# Patient Record
Sex: FEMALE | Race: WHITE
[De-identification: names, ages, dates, MRNs, and addresses within clinical notes are randomized per-mention and may not be internally consistent; named-entity substitution may affect disease eponyms.]

---

## 2017-12-06 ENCOUNTER — HOSPITAL ENCOUNTER (OUTPATIENT)
Dept: HOSPITAL 75 - RAD | Age: 52
End: 2017-12-06
Attending: FAMILY MEDICINE
Payer: MEDICARE

## 2017-12-06 DIAGNOSIS — Z12.31: Primary | ICD-10-CM

## 2017-12-07 NOTE — DIAGNOSTIC IMAGING REPORT
Bilateral screening mammogram 2D views with tomosynthesis.



The current study was also evaluated with a Computer Aided

Detection (CAD) system.



INDICATION: Screening. No current complaints stated on the

questionnaire.



COMPARISON: 11/10/16.



FINDINGS:



Please note that only CC projections were obtained of the breasts

due to difficulty related to patient mental status. Scattered

fibroglandular densities are seen and scattered benign-appearing

calcifications are noted. Allowing for technique and positional

differences, no suspicious change is seen.



IMPRESSION: No significant change.



ACR BI-RADS Category 2: Benign findings.

Result letter will be mailed to the patient.

Note: At least 10% of breast cancer is not imaged by mammography.



Dictated by: 



  Dictated on workstation # [id number]

## 2018-02-07 ENCOUNTER — HOSPITAL ENCOUNTER (OUTPATIENT)
Dept: HOSPITAL 75 - PREOP | Age: 53
End: 2018-02-07
Attending: PODIATRIST
Payer: MEDICARE

## 2018-02-07 VITALS — WEIGHT: 145 LBS | HEIGHT: 69 IN | BODY MASS INDEX: 21.48 KG/M2

## 2018-02-07 DIAGNOSIS — Z01.818: Primary | ICD-10-CM

## 2018-02-07 DIAGNOSIS — M20.11: ICD-10-CM

## 2018-02-07 NOTE — HISTORY AND PHYSICAL
DATE OF SERVICE:  



DATE OF ADMISSION:  02/13/2018.



To have outpatient surgery by Dr. Mccurdy.



CHIEF COMPLAINT:

To have foot surgery.



ALLERGIES:

XANAX, NEOSPORIN, LITHIUM, MOBAN, RITALIN, BACTRIM, TEGRETOL, KLONOPIN, _____

AND ZOLOFT.



The patient has a diagnosis of bipolar with manic depressive and mentally

retarded.



MEDICATIONS:

Tylenol, Benadryl, hydrocortisone cream, ibuprofen p.r.n., milk of magnesia.



REVIEW OF SYSTEMS:

HEAD:  Denies headaches, dizziness, fainting.  The patient brought in by work.

EYES, EARS, NOSE AND THROAT:  No problems with the eyes, ears, nose and throat.

HEART:  No heart problems.

LUNGS:  Denies coughing, congestion and wheezing.

GASTROINTESTINAL:  Appetite okay.  Denies blood in the stool or diarrhea.  _____

no blood, pain or frequency.



PHYSICAL EXAMINATION:

GENERAL:  The patient is a white female, well-nourished, well-developed in no

acute respiratory distress at rest.

EARS:  Noninflamed.

EYES:  No conjunctivitis.

THROAT:  Noninflamed.

NECK:  Thyroid not enlarged.  No abnormal cervical lymphadenopathy noted.

HEART:  Regular rate and rhythm.

LUNGS:  Clear to auscultation.

ABDOMEN:  Soft.

VITAL SIGNS:  Pulse 76, blood pressure 130/70.



PLAN:

The patient is okay for surgery, will be on standby if she has any problems.





Job ID: [id number]

DocumentID: [id number]

Dictated Date:  02/07/2018 10:11:15

Transcription Date: 02/07/2018 11:16:47

Dictated By: FOZIA SALAS DO

## 2018-02-13 ENCOUNTER — HOSPITAL ENCOUNTER (OUTPATIENT)
Dept: HOSPITAL 75 - SDC | Age: 53
Discharge: HOME | End: 2018-02-13
Attending: PODIATRIST
Payer: MEDICARE

## 2018-02-13 VITALS — SYSTOLIC BLOOD PRESSURE: 121 MMHG | DIASTOLIC BLOOD PRESSURE: 87 MMHG

## 2018-02-13 VITALS — DIASTOLIC BLOOD PRESSURE: 80 MMHG | SYSTOLIC BLOOD PRESSURE: 119 MMHG

## 2018-02-13 VITALS — DIASTOLIC BLOOD PRESSURE: 82 MMHG | SYSTOLIC BLOOD PRESSURE: 129 MMHG

## 2018-02-13 VITALS — SYSTOLIC BLOOD PRESSURE: 132 MMHG | DIASTOLIC BLOOD PRESSURE: 89 MMHG

## 2018-02-13 VITALS — BODY MASS INDEX: 21.48 KG/M2 | WEIGHT: 145 LBS | HEIGHT: 69 IN

## 2018-02-13 DIAGNOSIS — F79: ICD-10-CM

## 2018-02-13 DIAGNOSIS — M20.11: Primary | ICD-10-CM

## 2018-02-13 DIAGNOSIS — Z79.899: ICD-10-CM

## 2018-02-13 DIAGNOSIS — F31.9: ICD-10-CM

## 2018-02-13 PROCEDURE — 73620 X-RAY EXAM OF FOOT: CPT

## 2018-02-13 PROCEDURE — 87081 CULTURE SCREEN ONLY: CPT

## 2018-02-13 NOTE — DIAGNOSTIC IMAGING REPORT
INDICATION: 

Status post hallux valgus deformity repair.



COMPARISON: 

None.



FINDINGS: 

Two radiographic views of the right foot were obtained. There is

well-defined absence of the medial cortex of the distal first

metatarsal. No unexpected radiopaque foreign bodies are seen. The

osseous structures otherwise appear to be intact. The joint

spaces are maintained. There appears to be mild soft tissue

swelling.



IMPRESSION:

1. No evidence of acute fracture or dislocation of the right

foot.



2. Focal cortical defect of the distal margins of the first

metatarsal. Correlation with surgical history is recommended. No

unexpected radiopaque foreign bodies.



Dictated by: 



  Dictated on workstation # [id number]

## 2018-02-13 NOTE — DISCHARGE INSTRUCTIONS
Discharge Instructions


Discharge Medications


New, Converted or Re-Newed RX:  RX Given to Pt/Family





Patient Instructions


Patient Instructions


1. Follow up in office in 2 weeks.





2. Diet as tolerated.





3. Activity as tolerated.





Activity & Diet


Activity as Tolerated:  Yes











FOZIA PRESLEY DPM Feb 13, 2018 9:14 am

## 2018-02-13 NOTE — PROGRESS NOTE-PRE OPERATIVE
Pre-Operative Progress Note


H&P Reviewed


The H&P was reviewed, patient examined and no changes noted.


Date Seen by Provider:  Feb 13, 2018


Time Seen by Provider:  07:30


Date H&P Reviewed:  Feb 13, 2018


Time H&P Reviewed:  07:31


Pre-Operative Diagnosis:  halllux valgus right foot.











FOZIA PRESLEY DPM Feb 13, 2018 7:31 am

## 2018-02-13 NOTE — PHYSICAL THERAPY PROGRESS NOTE
Therapy Progress Note


Nurse states that patient does not need physical therapy this time.  Evaluation 

attempted but not performed.











BRITTANY COOPER PT Feb 13, 2018 11:47

## 2018-02-13 NOTE — PROGRESS NOTE-POST OPERATIVE
Post-Operative Progess Note


Surgeon (s)/Assistant (s)


Surgeon


FOZIA PRESLEY DPM


Assistant:  none





Pre-Operative Diagnosis


halllux valgus right foot.





Post-Operative Diagnosis





same





Procedure & Operative Findings


Date of Procedure


2/13/18


Procedure Performed/Findings


same


Anesthesia Type


general with infiltration





Estimated Blood Loss


Estimated blood loss (mL):  min





Specimens/Packing


Specimens Removed


bone from first metatarsal right


Packing:  


none











FOZIA PRESLEY DPM Feb 13, 2018 9:12 am

## 2018-02-15 NOTE — XMS REPORT
Continuity of Care Document

 Created on: 02/15/2018



OCTAVIO MILLIGAN

External Reference #: [id number]

: 1965

Sex: Female



Demographics







 [place]  [address]

 

 Home Phone  [phone] x

 

 Preferred Language  Unknown

 

 Marital Status  Unknown

 

 Rastafarian Affiliation  Unknown

 

 Race  Unknown

 

 Ethnic Group  Unknown





Author







 Author  [facility]

 

 Organization  [facility]

 

 Address  Unknown

 

 Phone  Unavailable



              



Allergies

      





 Active            Description            Code            Type            
Severity            Reaction            Onset            Reported/Identified   
         Relationship to Patient            Clinical Status        

 

 Yes            alprazolam            [id number]            Drug Allergy       
     Unknown            N/A                         2016                 
                 

 

 Yes            alprazolam            [id number]            Drug Allergy       
     Unknown            RASH                         2016                
                  

 

 Yes            aripiprazole            [id number]            Drug Allergy     
       Unknown            N/A                         2016               
                   

 

 Yes            carbamazepine            [id number]            Drug Allergy    
        Unknown            N/A                         2016              
                    

 

 Yes            clonazepam            [id number]            Drug Allergy       
     Unknown            N/A                         2016                 
                 

 

 Yes            diazepam            C940439752            Drug Allergy         
   Unknown            N/A                         2016                   
               

 

 Yes            diphenhydramine            [id number]            Drug Allergy  
          Unknown            N/A                         2016            
                      

 

 Yes            divalproex sodium            [id number]            Drug Allergy
            Unknown            N/A                         2016          
                        

 

 Yes            gabapentin            P061258220            Drug Allergy       
     Unknown            N/A                         2016                 
                 

 

 Yes            haloperidol            L558409855            Drug Allergy      
      Unknown            N/A                         2016                
                  

 

 Yes            lithium            Q169741270            Drug Allergy          
  Unknown            N/A                         2016                    
              

 

 Yes            lorazepam            [id number]            Drug Allergy        
    Unknown            N/A                         2016                  
                

 

 Yes            loxapine            H229768686            Drug Allergy         
   Unknown            N/A                         2016                   
               

 

 Yes            methylphenidate            Y626097905            Drug Allergy  
          Unknown            N/A                         2016            
                      

 

 Yes            molindone            E947373783            Drug Allergy        
    Unknown            N/A                         2016                  
                

 

 Yes            NEOSPORIN EYE DROPS            NEOSPORIN EYE DROPS             
            Unknown            N/A                         2016          
                        

 

 Yes            olanzapine            X585140429            Drug Allergy       
     Unknown            N/A                         2016                 
                 

 

 Yes            pemoline            X471584309            Drug Allergy         
   Unknown            N/A                         2016                   
               

 

 Yes            Phenothiazines            V286724531            Drug Allergy   
         Unknown            N/A                         2016             
                     

 

 Yes            prednisone            V475499394            Drug Allergy       
     Unknown            N/A                         2016                 
                 

 

 Yes            quetiapine            M395298581            Drug Allergy       
     Unknown            N/A                         2016                 
                 

 

 Yes            RISPERDOL            RISPERDOL                         Unknown 
           N/A                         2016                              
    

 

 Yes            sertraline            O980573657            Drug Allergy       
     Unknown            N/A                         2016                 
                 

 

 Yes            sulfamethoxazole            X601037761            Drug Allergy 
           Unknown            N/A                         2016           
                       

 

 Yes            sulfamethoxazole            N091329623            Drug Allergy 
           Unknown            RASH                         2016          
                        

 

 Yes            trimethoprim            C206234077            Drug Allergy     
       Unknown            N/A                         2016               
                   

 

 Yes            ziprasidone            H936987278            Drug Allergy      
      Unknown            N/A                         2016                
                  



                                                                      



Medications

      



There is no data.                  



Problems

      





 Date Dx Coded            Attending            Type            Code            
Diagnosis            Diagnosed By        

 

 10/15/2010                         Ot            296.80                       
           

 

 10/15/2010                         Ot            564.00                       
           

 

 10/15/2010                         Ot            788.20                       
           

 

 2015                         Ot            611.72                       
           

 

 2015                         Ot            V76.12                       
           

 

 2015                         Ot            610.0                        
          

 

 10/14/2015            GELLENDER DO FOZIA LOPEZ            Ot            V76.12 
                                 

 

 10/27/2015                         Ot            611.72                       
           

 

 10/27/2015                         Ot            V76.12                       
           

 

 10/27/2015                         Ot            610.0                        
          

 

 10/27/2015            GELLENDER DO FOZIA LOPEZ            Ot            V76.12 
                                 

 

 10/27/2015            GELLENDER DO, FOZIA LOPEZ            Ot            V76.12 
                                 

 

 2015            GELLENDER DO, FOZIA LOPEZ            Ot            R92.8  
                                

 

 2015            GELLENDER DO, FOZIA LOPEZ            Ot            R92.8  
                                

 

 2015            GELLENDER DO, FOZIA LOPEZ            Ot            R92.8  
                                

 

 2016                         Ot            611.72                       
           

 

 2016                         Ot            V76.12                       
           

 

 2016                         Ot            610.0                        
          

 

 2016            GELLENDER DO, FOZIA LOPEZ            Ot            V76.12 
                                 

 

 2016            GELLENDER DO, FOZIA LOPEZ            Ot            R92.8  
                                

 

 2016                         Ot            611.72                       
           

 

 2016                         Ot            V76.12                       
           

 

 2016                         Ot            610.0                        
          

 

 2016            GELLENDER DO, FOZIA LOPEZ            Ot            V76.12 
                                 

 

 2016            GELLENDER DO, FOZIA LOPEZ            Ot            R92.8  
                                

 

 2016            CLOTHIER DDS, BRIGIDA ELY            Ot            K02.9   
         DENTAL CARIES, UNSPECIFIED                     

 

 2016            CLOTHIER DDS, BRIGIDA ELY            Ot            K05.30  
          CHRONIC PERIODONTITIS, UNSPECIFIED                     

 

 11/10/2016            GELLENDER DO, FOZIA LOPEZ            Ot            Z12.31 
           ENCNTR SCREEN MAMMOGRAM FOR MALIGNANT NE                     

 

 11/10/2016            GELLENDER DO, FOZIA LOPEZ            Ot            Z12.31 
           ENCNTR SCREEN MAMMOGRAM FOR MALIGNANT NE                     

 

 2016            GELLENDER DO, FOZIA LOPEZ            Ot            Z12.31 
           ENCNTR SCREEN MAMMOGRAM FOR MALIGNANT NE                     

 

 2016            GELLENDER DO, FOZIA LOPEZ            Ot            Z12.31 
           ENCNTR SCREEN MAMMOGRAM FOR MALIGNANT NE                     

 

 2016            GELLENDER DO, FOZIA LOPEZ            Ot            Z12.31 
           ENCNTR SCREEN MAMMOGRAM FOR MALIGNANT NE                     

 

 2018            GELLENDER DO, FOZIA LOPEZ            Ot            Z12.31 
           ENCNTR SCREEN MAMMOGRAM FOR MALIGNANT NE                     

 

 2018            GELLENDER DO, FOZAI LOPEZ            Ot            Z12.31 
           ENCNTR SCREEN MAMMOGRAM FOR MALIGNANT NE                     

 

 2018            GELLENDER DO, FOZIA LOPEZ            Ot            V76.12 
           OTH SCREEN MAMMO-MALIGN NEOPLASM OF MARIO                     

 

 2018            GELLENDER DO, FOZIA LOPEZ            Ot            R92.8  
          OTH ABN AND INCONCLUSIVE FINDINGS ON DX                      

 

 2018            CLOTHIER DDS, BRIGIDA ELY            Ot            K02.9   
         DENTAL CARIES, UNSPECIFIED                     

 

 2018            CLOTHIER DDS, BRIGIDA ELY            Ot            Z01.818 
           ENCOUNTER FOR OTHER PREPROCEDURAL EXAMIN                     

 

 2018            GELLENDER DO, FOZIA LOPEZ            Ot            Z12.31 
           ENCNTR SCREEN MAMMOGRAM FOR MALIGNANT NE                     

 

 2018            GELLENDER DO, FOZIA LOPEZ            Ot            Z12.31 
           ENCNTR SCREEN MAMMOGRAM FOR MALIGNANT NE                     



                                                                               
                 



Procedures

      



There is no data.                  



Results

      



There is no data.              



Encounters

      





 ACCT No.            Visit Date/Time            Discharge            Status    
        Pt. Type            Provider            Facility            Loc./Unit  
          Complaint        

 

 [id number]            2018 05:37:00            2018 13:25:00    
        DIS            Outpatient            FOZIA PRESLEY DPM            
[facility]            PREOP            HALLUX VALGUS 
RIGHT FOOT        

 

 [id number]            2017 09:27:00            2017 23:59:59    
        CLS            Outpatient            FOZIA SALAS DO            
[facility]            RAD            YEARLY        

 

 [id number]            11/10/2016 10:03:00            11/10/2016 23:59:59    
        CLS            Outpatient            FOZIA SALAS DO            
[facility]            RAD            SCREENING        

 

 [id number]            2016 11:19:00            2016 15:25:00    
        DIS            Outpatient            CLOTHIER CHASBRIGIDA            
[facility]            DENTAL REHAB        

 

 [id number]            2016 05:38:00            2016 23:59:59    
        CLS            Outpatient            CLOTHIER ALANNABRIGIDA BRITO            
[facility]            PREOP            DENTAL REHAB      
  

 

 [id number]            10/26/2015 13:09:00            10/26/2015 23:59:59    
        CLS            Outpatient            FOZIA SALAS DO            
[facility]            RAD            ABN MAMMO ON   
      

 

 [id number]            2015 09:25:00            2015 23:59:59    
        CLS            Outpatient            FOZIA SALAS DO            
[facility]            RAD            SCREENING        

 

 [id number]            2018 08:00:00                         PEN       
     Preadmit            PRESLEYFOZIA FRIEDMAN DPM            [facility]            HALLUX VALGUS RIGHT FOOT        

 

 [id number]            11/10/2010 09:41:00                                   
   Document Registration                                                       
     

 

 [id number]            2010 10:16:00                                   
   Document Registration                                                       
     

 

 [id number]            10/15/2010 20:33:00                                   
   Document Registration

## 2019-02-26 ENCOUNTER — HOSPITAL ENCOUNTER (OUTPATIENT)
Dept: HOSPITAL 75 - RAD | Age: 54
End: 2019-02-26
Attending: FAMILY MEDICINE
Payer: MEDICARE

## 2019-02-26 DIAGNOSIS — Z53.8: ICD-10-CM

## 2019-02-26 DIAGNOSIS — Z12.31: Primary | ICD-10-CM

## 2021-03-18 ENCOUNTER — HOSPITAL ENCOUNTER (OUTPATIENT)
Dept: HOSPITAL 75 - RAD | Age: 56
End: 2021-03-18
Attending: FAMILY MEDICINE
Payer: MEDICARE

## 2021-03-18 DIAGNOSIS — R26.2: ICD-10-CM

## 2021-03-18 DIAGNOSIS — M25.571: Primary | ICD-10-CM

## 2021-03-18 PROCEDURE — 73610 X-RAY EXAM OF ANKLE: CPT

## 2021-03-18 NOTE — DIAGNOSTIC IMAGING REPORT
INDICATION: Pain.



COMPARISON: Imaging of the right foot dated 02/13/2018



TECHNIQUE: 3 radiographs of the right ankle dated 03/18/2021



FINDINGS: Small well-corticated ossific densities are identified

adjacent to the tips of the medial and lateral malleoli. No acute

fracture or dislocation. No destructive osseous process. The

talar dome is unremarkable. Ankle mortise is symmetric. No

suspicious radiopaque foreign body.



IMPRESSION: Tiny chronic appearing avulsion fractures arising

from the tips of the medial and lateral malleoli without acute

osseous abnormality.



Dictated by: 



  Dictated on workstation # [id number]

## 2021-03-19 ENCOUNTER — HOSPITAL ENCOUNTER (OUTPATIENT)
Dept: HOSPITAL 75 - RAD | Age: 56
End: 2021-03-19
Attending: FAMILY MEDICINE
Payer: MEDICARE

## 2021-03-19 DIAGNOSIS — M20.11: Primary | ICD-10-CM

## 2021-03-19 PROCEDURE — 73630 X-RAY EXAM OF FOOT: CPT

## 2021-03-19 NOTE — DIAGNOSTIC IMAGING REPORT
Indication: Right foot pain



3 views the right foot show hallux valgus deformity of the 1st

MTP joint. There is no acute fracture or dislocation.



Impression: Hallux valgus deformity. No acute abnormality seen.



Dictated by: 



  Dictated on workstation # [id number]

## 2021-04-14 NOTE — OPERATIVE REPORT
DATE OF SERVICE:  



PREOPERATIVE DIAGNOSIS:

Hallux valgus, right foot.



POSTOPERATIVE DIAGNOSIS:

Hallux valgus, right foot.



NAME OF OPERATION:

Modified Pacheco bunionectomy, right foot.



DESCRIPTION OF OPERATION:

With the patient in supine position, having been affected by a regional

anesthetic utilizing 9 mL of 50:50 mixture of 0.5% Marcaine plain and 1%

Carbocaine plain with anesthesia assist.  The patient was placed under general

anesthesia, sterile prep and drape were performed and a Newton bandage was

applied above the level of the right ankle.  A 7 cm curvilinear incision was

made over the dorsal aspect of the first metatarsal medial to the EHL tendon

extending down to the first digit.  This incision was deepened with sharp and

blunt dissection.  Vital structures identified and retracted.  Superficial veins

were cauterized.  Dissection was carried deep to the first MPJ, first interspace

was explored.  A lateral capsulotomy was performed and the head of the adductor

hallucis was tenotomized.  The extensor hallucis brevis tendon was also

tenotomized.  An T-shaped capsular incision was made in the medial aspect of the

first metatarsal phalangeal joint capsule.  Capsule periosteum freed from the

dorsal, medial and lateral aspects of first metatarsal head, well developed

exostosis was resected in the medial aspect of the first metatarsal head and

remodeled to normal bony contours.  The toe was placed in a rectus position and

the vertical arm of the capsule was resected approximately 3 mm.  Capsule was

closed with continuous locked suture of 3-0 Vicryl.  Superficial fascia closed

with continuous suture of 4-0 Vicryl and skin closed with continuous locked

suture of 4-0 Prolene.  Tourniquet was released.  Blood flow returned to digits

was within normal limits.  Decadron was introduced to the operative site to

control postoperative pain and swelling.  Adaptic, sterile corrective

compressive wet to dry Betadine dressing were applied carried above the level of

the right ankle covered with circular Coban.  The patient tolerated the

procedure well with minimal blood loss, left the OR to PAR in apparent good

condition.  She is to be seen in the office in 2 weeks for appropriate followup

care.





Job ID: [id number]

DocumentID: [id number]

Dictated Date:  02/13/2018 09:23:07

Transcription Date: 02/13/2018 13:56:47

Dictated By: FOZIA PRESLEY DPM Excision Depth: adipose tissue

## 2021-04-19 ENCOUNTER — HOSPITAL ENCOUNTER (OUTPATIENT)
Dept: HOSPITAL 75 - RAD | Age: 56
End: 2021-04-19
Attending: FAMILY MEDICINE
Payer: MEDICARE

## 2021-04-19 DIAGNOSIS — Z53.9: Primary | ICD-10-CM

## 2022-09-14 ENCOUNTER — HOSPITAL ENCOUNTER (EMERGENCY)
Dept: HOSPITAL 75 - ER | Age: 57
Discharge: HOME | End: 2022-09-14
Payer: MEDICARE

## 2022-09-14 VITALS — SYSTOLIC BLOOD PRESSURE: 128 MMHG | DIASTOLIC BLOOD PRESSURE: 85 MMHG

## 2022-09-14 VITALS — HEIGHT: 67.72 IN | BODY MASS INDEX: 20.62 KG/M2 | WEIGHT: 134.48 LBS

## 2022-09-14 DIAGNOSIS — S60.212A: Primary | ICD-10-CM
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
## 2022-09-14 PROCEDURE — 73110 X-RAY EXAM OF WRIST: CPT

## 2022-09-14 NOTE — DIAGNOSTIC IMAGING REPORT
HISTORY: Left wrist pain after fall.



TECHNIQUE: 3 views of the left wrist



COMPARISON: None



FINDINGS:



No acute fracture is seen in the left wrist. Alignment appears

normal. Joint spaces are preserved. No cortical erosions are

seen. There is mild dorsal soft tissue swelling.



IMPRESSION:

1. Mild dorsal soft tissue swelling with no acute osseous

abnormalities seen in the left wrist.



Dictated by: 



  Dictated on workstation # [id number]

## 2022-09-14 NOTE — ED UPPER EXTREMITY
General


Chief Complaint:  Upper Extremity


Stated Complaint:  WRIST INJURY


Nursing Triage Note:  


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 


FURTHER INSPECTION NOTED 2 ABRASIONS ON THE LEFT ELBOW AND PROXIMAL FOREARM AND 


SWELLING OF THE WRIST. WORKER PLACED AN ICE PACK AND WRAPPED INJURY IN AN ACE 


BANDAGE FOR TRANSPORT.


Source:  caregiver


Exam Limitations:  no limitations





History of Present Illness


Date Seen by Provider:  Sep 14, 2022


Time Seen by Provider:  18:40


Initial Comments


56-year-old female with schizophrenia and severe neurocognitive developmental 

delay presents from her group home for left wrist pain.  She fell at about noon 

today.  The wrist initially look normal but has progressed and swelling 

throughout the day.  She is complaining some pain.





Allergies and Home Medications


Allergies


Coded Allergies:  


     Phenothiazines (Unverified  Allergy, Unknown, 9/14/22)


     alprazolam (Unverified  Allergy, Unknown, RASH, 9/14/22)


     aripiprazole (Unverified  Allergy, Unknown, 9/14/22)


     carbamazepine (Unverified  Allergy, Unknown, 9/14/22)


     clonazepam (Unverified  Allergy, Unknown, 9/14/22)


     diazepam (Unverified  Allergy, Unknown, 9/14/22)


     divalproex sodium (Unverified  Allergy, Unknown, 9/14/22)


     gabapentin (Unverified  Allergy, Unknown, 9/14/22)


     haloperidol (Unverified  Allergy, Unknown, 9/14/22)


     lithium (Unverified  Allergy, Unknown, 9/14/22)


     lorazepam (Unverified  Allergy, Unknown, 9/14/22)


     loxapine (Unverified  Allergy, Unknown, 9/14/22)


     methylphenidate (Unverified  Allergy, Unknown, 9/14/22)


     molindone (Unverified  Allergy, Unknown, 9/14/22)


     olanzapine (Unverified  Allergy, Unknown, 9/14/22)


     pemoline (Unverified  Allergy, Unknown, 9/14/22)


     prednisone (Unverified  Allergy, Unknown, 9/14/22)


     quetiapine (Unverified  Allergy, Unknown, 9/14/22)


     sertraline (Unverified  Allergy, Unknown, 9/14/22)


     trimethoprim (Unverified  Allergy, Unknown, 9/14/22)


     ziprasidone (Unverified  Allergy, Unknown, 9/14/22)


     diphenhydramine (Unverified  Adverse Reaction, Unknown, 9/14/22)


   "caused urinary retention"


     sulfamethoxazole (Unverified  Adverse Reaction, Unknown, RASH, 9/14/22)


Uncoded Allergies:  


     NEOSPORIN EYE DROPS (Allergy, Unknown, 2/23/16)


     RISPERDOL (Allergy, Unknown, 2/23/16)





Patient Home Medication List


Home Medication List Reviewed:  Yes


Polyethylene Glycol 3350 (Miralax) 119 Gm Powder, 17 GM PO every other day, 

(Reported)


   Entered as Reported by: EILEEN DÍAZ on 2/19/16 0922





Review of Systems


Constitutional:  no symptoms reported


EENTM:  no symptoms reported


Respiratory:  no symptoms reported


Cardiovascular:  no symptoms reported


Gastrointestinal:  no symptoms reported


Genitourinary:  no symptoms reported


Musculoskeletal:  joint pain


Skin:  no symptoms reported


Psychiatric/Neurological:  No Symptoms Reported





Past Medical-Social-Family Hx


Patient Social History


Tobacco Use?:  No


Use of E-Cig and/or Vaping dev:  No


Substance use?:  No


Alcohol Use?:  No


Pt feels they are or have been:  No





Immunizations Up To Date


Influenza Vaccine Up-to-Date:  Yes; Up-to-Date





Seasonal Allergies


Seasonal Allergies:  No





Past Medical History


Hysterectomy


Chronic Constipation


Bipolar





Family Medical History


Reviewed Nursing Family Hx


No Pertinent Family Hx





Physical Exam


Vital Signs





Vital Signs - First Documented








 9/14/22





 18:15


 


Temp 36.8


 


Pulse 95


 


Resp 18


 


B/P (MAP) 150/95 (113)


 


Pulse Ox 97


 


O2 Delivery Room Air





Capillary Refill : Less Than 3 Seconds


Height, Weight, BMI


Height: 5'9.00"


Weight: 145lbs. 0.0oz. [id number]; 20.00 BMI


Method:


General Appearance:  WD/WN, no apparent distress


HEENT:  normal ENT inspection, pharynx normal


Neck:  non-tender, supple


Cardiovascular:  normal peripheral pulses, regular rate, rhythm, no edema, no 

murmur


Respiratory:  chest non-tender, lungs clear, normal breath sounds, no 

respiratory distress, no accessory muscle use


Gastrointestinal:  normal bowel sounds, non tender, soft, no organomegaly


Back:  normal inspection, no CVA tenderness, no vertebral tenderness


Shoulder:  normal inspection, non-tender, no evidence of injury, normal ROM


Elbow/Forearm:  normal inspection, non-tender, no evidence of injury, normal ROM


Wrist:  Yes swelling (Slight swelling to the medial aspect of the left wrist.  

There are some tenderness palpation in this area.  No obvious deformity.  

Neurovascular motor and sensory intact distally.)


Neurologic/Tendon:  normal sensation, normal motor functions, normal tendon 

functions


Skin:  normal color, warm/dry





Progress/Results/Core Measures


Results/Orders


My Orders





Orders - KARMA MERCER DO


Wrist, Left, 3 Views Or More (9/14/22 18:41)





Vital Signs/I&O











 9/14/22 9/14/22





 18:15 19:10


 


Temp 36.8 


 


Pulse 95 88


 


Resp 18 18


 


B/P (MAP) 150/95 (113) 128/85


 


Pulse Ox 97 98


 


O2 Delivery Room Air Room Air














Blood Pressure Mean:                    113











Departure


Communication (Admissions)


X-rays negative.  Discharged home with removable splint, supportive care.





Impression





   Primary Impression:  


   Contusion of wrist


   Qualified Codes:  S60.212A - Contusion of left wrist, initial encounter


Disposition:  01 HOME, SELF-CARE


Condition:  Stable





Departure-Patient Inst.


Referrals:  


FOZIA SALAS DO (PCP/Family)


Primary Care Physician


Patient Instructions:  Contusion (DC)





Add. Discharge Instructions:  


Give her Motrin and Tylenol as needed for pain.  X-rays are negative.  Return to

the emergency department for any severe concerns.  Ice the area as needed.





All discharge instructions reviewed with patient and/or family. Voiced 

understanding.











KARMA MERCER DO            Sep 14, 2022 18:55